# Patient Record
Sex: MALE | Race: WHITE | NOT HISPANIC OR LATINO | Employment: STUDENT | URBAN - METROPOLITAN AREA
[De-identification: names, ages, dates, MRNs, and addresses within clinical notes are randomized per-mention and may not be internally consistent; named-entity substitution may affect disease eponyms.]

---
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
## 2021-11-30 ENCOUNTER — HISTORICAL (OUTPATIENT)
Dept: LAB | Facility: HOSPITAL | Age: 21
End: 2021-11-30

## 2021-11-30 LAB
ABS NEUT (OLG): 3.43 X10(3)/MCL (ref 2.1–9.2)
ALBUMIN SERPL-MCNC: 4.1 GM/DL (ref 3.5–5)
ALBUMIN/GLOB SERPL: 1.4 RATIO (ref 1.1–2)
ALP SERPL-CCNC: 70 UNIT/L (ref 40–150)
ALT SERPL-CCNC: 87 UNIT/L (ref 0–55)
APPEARANCE, UA: CLEAR
AST SERPL-CCNC: 43 UNIT/L (ref 5–34)
BACTERIA SPEC CULT: NORMAL
BASOPHILS # BLD AUTO: 0 X10(3)/MCL (ref 0–0.2)
BASOPHILS NFR BLD AUTO: 0 %
BILIRUB SERPL-MCNC: 0.6 MG/DL
BILIRUB UR QL STRIP: NORMAL
BILIRUBIN DIRECT+TOT PNL SERPL-MCNC: 0.2 MG/DL (ref 0–0.5)
BILIRUBIN DIRECT+TOT PNL SERPL-MCNC: 0.4 MG/DL (ref 0–0.8)
BUN SERPL-MCNC: 12.5 MG/DL (ref 8.9–20.6)
CALCIUM SERPL-MCNC: 9.7 MG/DL (ref 8.7–10.5)
CHLORIDE SERPL-SCNC: 101 MMOL/L (ref 98–107)
CHOLEST SERPL-MCNC: 208 MG/DL
CHOLEST/HDLC SERPL: 5 {RATIO} (ref 0–5)
CO2 SERPL-SCNC: 30 MMOL/L (ref 22–29)
COLOR UR: YELLOW
CREAT SERPL-MCNC: 0.84 MG/DL (ref 0.73–1.18)
EOSINOPHIL # BLD AUTO: 0.2 X10(3)/MCL (ref 0–0.9)
EOSINOPHIL NFR BLD AUTO: 3 %
ERYTHROCYTE [DISTWIDTH] IN BLOOD BY AUTOMATED COUNT: 12.1 % (ref 11.5–17)
EST. AVERAGE GLUCOSE BLD GHB EST-MCNC: 99.7 MG/DL
GLOBULIN SER-MCNC: 3 GM/DL (ref 2.4–3.5)
GLUCOSE (UA): NEGATIVE
GLUCOSE SERPL-MCNC: 93 MG/DL (ref 74–100)
HBA1C MFR BLD: 5.1 %
HCT VFR BLD AUTO: 44.6 % (ref 42–52)
HDLC SERPL-MCNC: 38 MG/DL (ref 35–60)
HGB BLD-MCNC: 15.3 GM/DL (ref 14–18)
HGB UR QL STRIP: NEGATIVE
IMM GRANULOCYTES # BLD AUTO: 0.07 % (ref 0–0.02)
IMM GRANULOCYTES NFR BLD AUTO: 1 % (ref 0–0.43)
KETONES UR QL STRIP: NEGATIVE
LDLC SERPL CALC-MCNC: 127 MG/DL (ref 50–140)
LEUKOCYTE ESTERASE UR QL STRIP: NEGATIVE
LYMPHOCYTES # BLD AUTO: 3 X10(3)/MCL (ref 0.6–4.6)
LYMPHOCYTES NFR BLD AUTO: 41 %
MCH RBC QN AUTO: 30.7 PG (ref 27–31)
MCHC RBC AUTO-ENTMCNC: 34.3 GM/DL (ref 33–36)
MCV RBC AUTO: 89.6 FL (ref 80–94)
MONOCYTES # BLD AUTO: 0.6 X10(3)/MCL (ref 0.1–1.3)
MONOCYTES NFR BLD AUTO: 8 %
NEUTROPHILS # BLD AUTO: 3.43 X10(3)/MCL (ref 1.4–7.9)
NEUTROPHILS NFR BLD AUTO: 47 %
NITRITE UR QL STRIP: NEGATIVE
PH UR STRIP: 6 [PH] (ref 5–9)
PLATELET # BLD AUTO: 235 X10(3)/MCL (ref 130–400)
PMV BLD AUTO: 11.5 FL (ref 9.4–12.4)
POTASSIUM SERPL-SCNC: 4.3 MMOL/L (ref 3.5–5.1)
PROT SERPL-MCNC: 7.1 GM/DL (ref 6.4–8.3)
PROT UR QL STRIP: NEGATIVE
RBC # BLD AUTO: 4.98 X10(6)/MCL (ref 4.7–6.1)
RBC #/AREA URNS HPF: NORMAL /[HPF]
SODIUM SERPL-SCNC: 139 MMOL/L (ref 136–145)
SP GR UR STRIP: >=1.03 (ref 1–1.03)
SQUAMOUS EPITHELIAL, UA: NORMAL
TRIGL SERPL-MCNC: 214 MG/DL (ref 34–140)
TSH SERPL-ACNC: 1.6 UIU/ML (ref 0.35–4.94)
UROBILINOGEN UR STRIP-ACNC: 0.2
VLDLC SERPL CALC-MCNC: 43 MG/DL
WBC # SPEC AUTO: 7.3 X10(3)/MCL (ref 4.5–11.5)
WBC #/AREA URNS HPF: NORMAL /[HPF]

## 2022-04-30 NOTE — OP NOTE
Patient:   Jose Sung            MRN: [id number]            FIN: [id number]               Age:   18 years     Sex:  Male     :  2000   Associated Diagnoses:   Chronic recurrent pilonidal cyst without abscess   Author:   Shad Ma MD      Operative Note   Operative Information   Date/ Time:  2018 11:07:00.     Procedures Performed: Pilonidal cyst excision.     Preoperative Diagnosis: Chronic recurrent pilonidal cyst without abscess ([id number] L05.91).     Postoperative Diagnosis: Chronic recurrent pilonidal cyst without abscess ([id number] L05.91).     Surgeon: Shad Ma MD.     Assistant: None.     Anesthesia: GETA.     Intake and Output: Fluid Balance Primitives   2018 13:55 CDT      EBL                       10 mL    2016 7:00 CDT       Oral Intake               600 mL                             Urine Count               3                             Stool Count               1     .     Esimated blood loss: loss less than  20  cc.     Description of Procedure/Findings/    Complications: After consent was obtained place matured the patient was brought And placed supine on the operating table and induced with general anesthesia.  He was endotracheally intubated without incident.  He was then turned prone on Ann Marie table and modified into a jackknife position.  His several areas and prepped and draped in sterile fashion.  He was noted to have recurrence of his pilonidal disease with a fistula tract to the left superior area.  Occasionally 15 blade to open the fistula tract and investigate its depth.  It proceeded to the superior midline and there were several long strands of hair apparent in that fistula tract.  Inspection of the other midline areas further down from this revealed multiple pilonidal pores that were hairbearing.  There was no evidence of abscess in any of these lower pilonidal pores.  I completed the excision of the fistula track in the upper  portion and obtained hemostasis with electrocautery.  Then injected 10 mL or percent Marcaine.  The wound was then packed open with half-inch iodoform gauze.  It was then dressed sterile and the patient recovered from his anesthesia.  He was turned supine and further recovered, extubated, and brought to recovery stable condition.  Overall he tolerated procedure well..

## 2022-04-30 NOTE — OP NOTE
DATE OF SURGERY:    01/23/2019    SURGEON:  Brad Sumner MD    ASSISTANT:  Dr. Ma    PROCEDURES:    1. Reconstruction of pilonidal cyst defect with fasciocutaneous rhomboid flap measuring 8 x 8 cm.  2. Placement of AmnioFix powder 40 mg into wound bed.  3. Placement of AmnioFix measuring 6 x 16 cm in the wound bed.    DRAINS:  15 round Harry drain.    SPECIMEN:  Pilonidal cyst.    ANESTHESIA:  General.    INDICATION FOR THE PROCEDURE:  Mr. Sung is a 18-year-old gentleman with a longstanding history of recurrent pilonidal cysts.  I was asked to evaluate the patient by Dr. Ma for potential reconstructive options after complete excision of the involved area.  I discussed the risks, benefits, and potential complications with his mother and father, as well as with him, and the informed consent was signed.    DESCRIPTION OF PROCEDURE:  Patient was identified in the preoperative holding area.  Informed consent was once again reviewed.  He was then subsequently brought to the operating room.  General endotracheal anesthesia was provided.  He was then placed in the prone position and prepped and draped in a sterile surgical fashion.  Timeout was taken.  Everyone was in agreement.  I marked out a chadwick-shaped excision over the area of the pilonidal cyst tissues that were involved.  It was approximately 8 x 8 cm.  Then Dr. Ma performed his portion of the procedure.  Please refer to his dictation for the excision of the procedure.  After he completed excision and achieved hemostasis, we then Pulsavac'd the area and then subsequently incised the skin for an inferiorly-based flap from the patient's right side of the area of excision.  This was incised with a 15 blade scalpel, and further dissection down through the subcutaneous tissues to the level of the muscular fascia was achieved with electrocautery.  This was completely undermined, and the rhomboid flap was noted to be easily able to be transposed  into the defect without any tension.  Hemostasis was assured.  I then placed Santiago within the wound bed.  I subsequently placed a 15 round Harry drain that exited in the patient's superior left buttock.  It was secured to the skin with 3-0 silk suture.  I then proceeded to place 40 mg of AmnioFix powder within the wound bed, as well as a sheet of AmnioFix measuring 6 x 16 cm within the wound bed and the donor site.  After this was accomplished, I then subsequently closed the wound in 3 layers.  This was done with 0 Vicryl sutures in the deep tissues, as well as the dermis in an interrupted fashion.  A running #0 Stratafix was then used in the deep dermis, and then a subcuticular 2-0 Stratafix was then used.  Dermabond and Prineo were then used as a dressing.  Patient tolerated the procedure.  There were no complications.  All counts correct.        ______________________________  MD DEMETRI Mercado/ARVIND  DD:  01/23/2019  Time:  10:39PM  DT:  01/23/2019  Time:  11:41PM  Job #:  [id number]

## 2022-04-30 NOTE — OP NOTE
DATE OF SURGERY:    06/11/2018    SURGEON:  Shad Ma MD    PREOPERATIVE DIAGNOSIS:  Complex pilonidal cyst with fistula.    POSTOPERATIVE DIAGNOSIS:  Complex pilonidal cyst with fistula.    PROCEDURE:  Excision pilonidal cyst complex.    ASSISTANT:  None.    ANESTHESIA:  General endotracheal anesthesia.    ESTIMATED BLOOD LOSS:  About 30 ml.    BLOOD REPLACEMENT:  None.    DRAINS:  Penrose drain was placed.    LAP, INSTRUMENT & NEEDLE COUNTS:  Reported as correct times two at the end of the case.     ASA is 1.     Weight is 244 pounds.  Height is 180 cm.    OPERATIVE TIME:  30 minutes.    PROCEDURE IN DETAIL:  After consent had been obtained and placed on the chart, the patient was brought to the Operating Room, placed supine on the Operating Room table, induced with general anesthesia and endotracheally intubated without incident.  He was then turned prone on OR table, which was modified lithotomy position.  His perineum was prepped and draped in a sterile fashion including sacral area as well.  He had extensive pilonidal disease down the midline overlying his sacrum.  He had a fistulous tract running from the abscess material to the left superior aspect.  I elected to go ahead and proceed with excision of this subcutaneous tissue.  By making incision on the left lateral gluteal skin and carrying that into the subcutaneous tissue, creating a subcutaneous flap toward the midline and completing excision of the pilonidal tissue in that manner.  When the tissue had been removed, I explored the fistula tract and also excised up to the skin level.  The area was then copiously irrigated with sterile saline and inspected.  Hemostasis was obtained with electrocautery.  I placed a Penrose drain through the fistula tract to facilitate drainage of the rather large subcutaneous cavity that was created.  There was approximately 6 X 3 X 3 cm subcutaneous cavity.  The Penrose drain was then sewn to the skin with  3-0 silk stitch.  The subcutaneous tissue area was injected with 20 ml of .5% Marcaine.  We reinspected the area and found it to be hemostatic.  I then closed the skin with interrupted 3-0 Vicryl sutures in the dermis.  Sterile dressings were applied.  The patient was recovered from his anesthesia.  He was extubated in the Operating Room after being turned supine.  He was then brought to recovery in stable condition.  Overall, he tolerated the procedure well.        ______________________________  MD TAMY Cote/JAYY  DD:  06/11/2018  Time:  02:11PM  DT:  06/11/2018  Time:  04:31PM  Job #:  [id number]    cc: Sarah Benito MD

## 2022-04-30 NOTE — OP NOTE
Patient:   Jose Sung            MRN: [id number]            FIN: [id number]               Age:   18 years     Sex:  Male     :  2000   Associated Diagnoses:   Pilonidal sinus with abscess   Author:   Shad Ma MD      Operative Note   Operative Information   Date/ Time:  2019 15:20:00.     Procedures Performed: Excision of pilonidal disease.     Preoperative Diagnosis: Pilonidal sinus with abscess ([id number] L05.02).     Postoperative Diagnosis: Pilonidal sinus with abscess ([id number] L05.02).     Surgeon: Shad Ma MD.     Assistant: Brad Sumner MD     Anesthesia: GETA  .     Intake and Output: Fluid Balance Primitives   .     Esimated blood loss: loss less than  25  cc.     Description of Procedure/Findings/    Complications: After consent was obtained and placed in the chart the patient was brought to the operating room and induced with general anesthesia.  He was then turned prone onto the OR table with appropriate padding.  When adequate level of anesthesia been obtained his sacral area was prepped and draped in a sterile fashion.  Dr. Sumner marked out the planned incision lines.  15 blade was used to incise the skin along the incision lines and using electrocautery was carried in the subcutaneous tissue and carried down to the level of the muscle fascia.  I then continued the incision to the medial aspect completing the excision of all of the diseased tissue.  The wound was then irrigated and inspected and hemostasis was obtained with electrocautery.  At this point the procedure was turned over to Dr. Sumner and that will be dictated separately..     Notes: Operative time for this portion of the procedure about 30 minutes..